# Patient Record
Sex: MALE | Race: ASIAN | URBAN - METROPOLITAN AREA
[De-identification: names, ages, dates, MRNs, and addresses within clinical notes are randomized per-mention and may not be internally consistent; named-entity substitution may affect disease eponyms.]

---

## 2017-08-17 NOTE — PROGRESS NOTES
HPI:   Beatris Delacruz is a 23year old obese, male who presents for multiple complaints today. Has anxiety and notes worsening symptoms over the past couple months with depressive symptoms.  C/o fatigue, sleep disturbances, intermittent tension headaches, de rashes,no suspicious lesions  EYES:PERRLA, EOMI, conjunctiva are clear  HEENT: atraumatic, normocephalic, mild serous fluid behind both TMs, erythematous nasal mucosa, no sinus tenderness, and throat is clear  NECK: supple,no adenopathy   LUNGS: clear to a Ophthalmic Solution; Place 1 drop into both eyes 2 (two) times daily. Dispense: 5 mL; Refill: 6    5.  Obesity (BMI 30-39.9)  - discussed BMI target  - discussed calorie counting, diet, exercise, and one pound of weight loss per week by decreasing calorie

## 2017-10-06 ENCOUNTER — OFFICE VISIT (OUTPATIENT)
Dept: FAMILY MEDICINE CLINIC | Facility: CLINIC | Age: 22
End: 2017-10-06

## 2017-10-06 VITALS
OXYGEN SATURATION: 98 % | WEIGHT: 228 LBS | HEIGHT: 65 IN | TEMPERATURE: 98 F | SYSTOLIC BLOOD PRESSURE: 110 MMHG | DIASTOLIC BLOOD PRESSURE: 80 MMHG | BODY MASS INDEX: 37.99 KG/M2 | RESPIRATION RATE: 18 BRPM | HEART RATE: 84 BPM

## 2017-10-06 DIAGNOSIS — Z00.00 ROUTINE PHYSICAL EXAMINATION: Primary | ICD-10-CM

## 2017-10-06 DIAGNOSIS — F41.9 ANXIETY: ICD-10-CM

## 2017-10-06 DIAGNOSIS — K59.00 CONSTIPATION, UNSPECIFIED CONSTIPATION TYPE: ICD-10-CM

## 2017-10-06 DIAGNOSIS — E55.9 VITAMIN D DEFICIENCY: ICD-10-CM

## 2017-10-06 DIAGNOSIS — J30.2 CHRONIC SEASONAL ALLERGIC RHINITIS, UNSPECIFIED TRIGGER: ICD-10-CM

## 2017-10-06 DIAGNOSIS — R73.09 ELEVATED HEMOGLOBIN A1C: ICD-10-CM

## 2017-10-06 DIAGNOSIS — H61.21 IMPACTED CERUMEN OF RIGHT EAR: ICD-10-CM

## 2017-10-06 DIAGNOSIS — J45.20 MILD INTERMITTENT ASTHMA WITHOUT COMPLICATION: ICD-10-CM

## 2017-10-06 DIAGNOSIS — E66.9 CLASS 2 OBESITY WITHOUT SERIOUS COMORBIDITY WITH BODY MASS INDEX (BMI) OF 37.0 TO 37.9 IN ADULT, UNSPECIFIED OBESITY TYPE: ICD-10-CM

## 2017-10-06 DIAGNOSIS — K21.9 GASTROESOPHAGEAL REFLUX DISEASE WITHOUT ESOPHAGITIS: ICD-10-CM

## 2017-10-06 PROCEDURE — 99395 PREV VISIT EST AGE 18-39: CPT | Performed by: FAMILY MEDICINE

## 2017-10-06 RX ORDER — MONTELUKAST SODIUM 10 MG/1
10 TABLET ORAL DAILY
Qty: 30 TABLET | Refills: 5 | Status: SHIPPED | OUTPATIENT
Start: 2017-10-06 | End: 2020-05-08

## 2017-10-06 RX ORDER — FLUTICASONE PROPIONATE 50 MCG
2 SPRAY, SUSPENSION (ML) NASAL DAILY
Qty: 1 BOTTLE | Refills: 5 | Status: SHIPPED | OUTPATIENT
Start: 2017-10-06 | End: 2019-04-19

## 2017-10-06 RX ORDER — OMEPRAZOLE 20 MG/1
20 CAPSULE, DELAYED RELEASE ORAL
Qty: 30 CAPSULE | Refills: 5 | Status: SHIPPED | OUTPATIENT
Start: 2017-10-06 | End: 2019-08-22

## 2017-10-06 NOTE — PROGRESS NOTES
Tramaine Pate is a 23year old male. HPI:  Patient is here today for routine physical.  H/o acid reflux a few years now. Intermittent flareups. Takes omeprazole daily as needed, which helps.   Drinks coffee once/day  Tries to avoid dietary triggers like sp Ophthalmic Solution Place 1 drop into both eyes 2 (two) times daily. Disp: 5 mL Rfl: 6   Albuterol Sulfate 108 (90 Base) MCG/ACT Inhalation Aerosol Powder, Breath Activated Inhale 2 puffs into the lungs every 6 (six) hours as needed.  Disp: 1 each Rfl: 1 defers  Noted recent labs w/  Overall good lipid panel. FBS normal. CMP nl, CBC nl     2. Gastroesophageal reflux disease without esophagitis  Advised on avoidance of dietary triggers. Avoid lying supine right after eating. Weight loss will help.   Recomm deficiency. Advised compliance with daily vitamin D3 supplement 2000 units daily. 10.  Obesity  Reviewed importance of diet/exercise/weight loss.   Consider formal weight loss program.TSH nl

## 2018-07-03 ENCOUNTER — MED REC SCAN ONLY (OUTPATIENT)
Dept: FAMILY MEDICINE CLINIC | Facility: CLINIC | Age: 23
End: 2018-07-03

## 2018-10-06 DIAGNOSIS — J45.20 MILD INTERMITTENT ASTHMA WITHOUT COMPLICATION: ICD-10-CM

## 2018-10-10 NOTE — TELEPHONE ENCOUNTER
LOV 10/17    LAST AAP 10/17     LAST RX   Albuterol Sulfate 108 (90 Base) MCG/ACT Inhalation Aerosol Powder, Breath Activated 1 each 1 8/17/2017         Next OV Visit date not found    PROTOCOL    Asthma & COPD Medication Protocol [id number] 4:48 PM   As

## 2019-04-19 PROBLEM — E55.9 VITAMIN D DEFICIENCY: Status: ACTIVE | Noted: 2019-04-19

## 2019-04-19 PROBLEM — J45.20 MILD INTERMITTENT ASTHMA WITHOUT COMPLICATION: Status: ACTIVE | Noted: 2019-04-19

## 2019-04-19 PROBLEM — R51.9 MORNING HEADACHE: Status: ACTIVE | Noted: 2019-04-19

## 2019-04-19 PROBLEM — E66.9 OBESITY (BMI 30-39.9): Status: ACTIVE | Noted: 2019-04-19

## 2019-04-19 PROBLEM — J45.20 MILD INTERMITTENT ASTHMA WITHOUT COMPLICATION (HCC): Status: ACTIVE | Noted: 2019-04-19

## 2019-04-19 PROBLEM — R53.83 FATIGUE, UNSPECIFIED TYPE: Status: ACTIVE | Noted: 2019-04-19

## 2019-04-19 PROBLEM — R41.840 DIFFICULTY CONCENTRATING: Status: ACTIVE | Noted: 2019-04-19

## 2019-04-19 PROBLEM — R10.13 DYSPEPSIA: Status: ACTIVE | Noted: 2019-04-19

## 2019-04-19 PROBLEM — G47.30 SLEEP-DISORDERED BREATHING: Status: ACTIVE | Noted: 2019-04-19

## 2019-06-08 PROCEDURE — 87338 HPYLORI STOOL AG IA: CPT | Performed by: INTERNAL MEDICINE

## 2019-07-19 PROBLEM — L20.9 ATOPIC DERMATITIS, UNSPECIFIED TYPE: Status: ACTIVE | Noted: 2019-07-19

## 2019-07-19 PROBLEM — R07.9 CHEST PAIN, UNSPECIFIED TYPE: Status: ACTIVE | Noted: 2019-07-19

## 2019-07-19 PROBLEM — F41.1 GAD (GENERALIZED ANXIETY DISORDER): Status: ACTIVE | Noted: 2019-07-19

## 2022-12-01 ENCOUNTER — HOSPITAL ENCOUNTER (OUTPATIENT)
Age: 27
Discharge: HOME | End: 2022-12-01
Attending: EMERGENCY MEDICINE
Payer: COMMERCIAL

## 2022-12-01 ENCOUNTER — APPOINTMENT (OUTPATIENT)
Dept: GENERAL RADIOLOGY | Age: 27
End: 2022-12-01
Attending: EMERGENCY MEDICINE
Payer: COMMERCIAL

## 2022-12-01 VITALS
WEIGHT: 262 LBS | HEART RATE: 81 BPM | SYSTOLIC BLOOD PRESSURE: 133 MMHG | RESPIRATION RATE: 18 BRPM | DIASTOLIC BLOOD PRESSURE: 79 MMHG | TEMPERATURE: 98 F | BODY MASS INDEX: 42.11 KG/M2 | HEIGHT: 66 IN | OXYGEN SATURATION: 99 %

## 2022-12-01 DIAGNOSIS — R07.9 CHEST PAIN OF UNCERTAIN ETIOLOGY: Primary | ICD-10-CM

## 2022-12-01 LAB
#MXD IC: 1.4 X10ˆ3/UL (ref 0.1–1)
ATRIAL RATE: 96 BPM
BUN BLD-MCNC: 21 MG/DL (ref 7–18)
CHLORIDE BLD-SCNC: 104 MMOL/L (ref 98–112)
CO2 BLD-SCNC: 25 MMOL/L (ref 21–32)
CREAT BLD-MCNC: 1.2 MG/DL
DDIMER WHOLE BLOOD: <200 NG/ML DDU (ref ?–400)
GFR SERPLBLD BASED ON 1.73 SQ M-ARVRAT: 84 ML/MIN/1.73M2 (ref 60–?)
GLUCOSE BLD-MCNC: 99 MG/DL (ref 70–99)
HCT VFR BLD AUTO: 43.1 %
HCT VFR BLD CALC: 43 %
HGB BLD-MCNC: 14.5 G/DL
ISTAT IONIZED CALCIUM FOR CHEM 8: 1.13 MMOL/L (ref 1.12–1.32)
LYMPHOCYTES # BLD AUTO: 3.8 X10ˆ3/UL (ref 1–4)
LYMPHOCYTES NFR BLD AUTO: 23.9 %
MCH RBC QN AUTO: 27.7 PG (ref 26–34)
MCHC RBC AUTO-ENTMCNC: 33.6 G/DL (ref 31–37)
MCV RBC AUTO: 82.3 FL (ref 80–100)
MIXED CELL %: 8.8 %
NEUTROPHILS # BLD AUTO: 10.5 X10ˆ3/UL (ref 1.5–7.7)
NEUTROPHILS NFR BLD AUTO: 67.3 %
P AXIS: 68 DEGREES
P-R INTERVAL: 160 MS
PLATELET # BLD AUTO: 343 X10ˆ3/UL (ref 150–450)
POCT BILIRUBIN URINE: NEGATIVE
POCT GLUCOSE URINE: NEGATIVE MG/DL
POCT KETONE URINE: NEGATIVE MG/DL
POCT LEUKOCYTE ESTERASE URINE: NEGATIVE
POCT NITRITE URINE: NEGATIVE
POCT PH URINE: 6 (ref 5–8)
POCT PROTEIN URINE: NEGATIVE MG/DL
POCT SPECIFIC GRAVITY URINE: 1.02
POCT URINE CLARITY: CLEAR
POCT URINE COLOR: YELLOW
POCT UROBILINOGEN URINE: 0.2 MG/DL
POTASSIUM BLD-SCNC: 3.9 MMOL/L (ref 3.6–5.1)
Q-T INTERVAL: 358 MS
QRS DURATION: 94 MS
QTC CALCULATION (BEZET): 452 MS
R AXIS: 59 DEGREES
RBC # BLD AUTO: 5.24 X10ˆ6/UL
SODIUM BLD-SCNC: 138 MMOL/L (ref 136–145)
T AXIS: 36 DEGREES
TROPONIN I BLD-MCNC: 0.02 NG/ML
TROPONIN I BLD-MCNC: <0.02 NG/ML
VENTRICULAR RATE: 96 BPM
WBC # BLD AUTO: 15.7 X10ˆ3/UL (ref 4–11)

## 2022-12-01 PROCEDURE — 99205 OFFICE O/P NEW HI 60 MIN: CPT

## 2022-12-01 PROCEDURE — 99204 OFFICE O/P NEW MOD 45 MIN: CPT

## 2022-12-01 PROCEDURE — 71046 X-RAY EXAM CHEST 2 VIEWS: CPT | Performed by: EMERGENCY MEDICINE

## 2022-12-01 PROCEDURE — 85025 COMPLETE CBC W/AUTO DIFF WBC: CPT | Performed by: EMERGENCY MEDICINE

## 2022-12-01 PROCEDURE — 81002 URINALYSIS NONAUTO W/O SCOPE: CPT | Performed by: EMERGENCY MEDICINE

## 2022-12-01 PROCEDURE — 93005 ELECTROCARDIOGRAM TRACING: CPT

## 2022-12-01 PROCEDURE — 85378 FIBRIN DEGRADE SEMIQUANT: CPT | Performed by: EMERGENCY MEDICINE

## 2022-12-01 PROCEDURE — 80047 BASIC METABLC PNL IONIZED CA: CPT

## 2022-12-01 PROCEDURE — 93010 ELECTROCARDIOGRAM REPORT: CPT

## 2022-12-01 PROCEDURE — 84484 ASSAY OF TROPONIN QUANT: CPT

## 2022-12-01 PROCEDURE — 96374 THER/PROPH/DIAG INJ IV PUSH: CPT

## 2022-12-01 RX ORDER — ASPIRIN 81 MG/1
324 TABLET, CHEWABLE ORAL ONCE
Status: COMPLETED | OUTPATIENT
Start: 2022-12-01 | End: 2022-12-01

## 2022-12-01 RX ORDER — VENLAFAXINE 100 MG/1
100 TABLET ORAL 2 TIMES DAILY
COMMUNITY

## 2022-12-01 RX ORDER — KETOROLAC TROMETHAMINE 30 MG/ML
30 INJECTION, SOLUTION INTRAMUSCULAR; INTRAVENOUS ONCE
Status: COMPLETED | OUTPATIENT
Start: 2022-12-01 | End: 2022-12-01

## 2022-12-01 NOTE — ED INITIAL ASSESSMENT (HPI)
Chest pain intermittent x 1 week. Pain upper left chest  Pain left arm with movement and numbness. Today had dizziness, SOB and nausea which concerned patient to come in for evaluation.

## 2022-12-02 NOTE — DISCHARGE INSTRUCTIONS
Take aspirin daily. Follow-up with your doctor for outpatient stress test.        You were seen in the immediate care in a limited time. There is a possibility that although we do not see any acute process at this present time that things can change with time. Is therefore imperative that you follow-up with primary care physician for close follow-up. If there is any significant progression of your pain  or other symptoms you to return immediately to the emergency room. DISCHARGE ISSUE - OTHER

## 2024-05-20 ENCOUNTER — TELEPHONE (OUTPATIENT)
Facility: CLINIC | Age: 29
End: 2024-05-20

## 2024-05-20 NOTE — TELEPHONE ENCOUNTER
Pt called for initial sleep consultation, stated his [facility] will be active in June; our first consultations with the sleep specialist are currently September so this will meet the insurance start date he mentions.     LVM to offer consultation. Does not appear to have prior Sleep Studies on file.  
yes

## 2024-05-21 ENCOUNTER — OFFICE VISIT (OUTPATIENT)
Dept: SLEEP MEDICINE | Age: 29
End: 2024-05-21

## 2024-05-21 VITALS
SYSTOLIC BLOOD PRESSURE: 142 MMHG | OXYGEN SATURATION: 98 % | BODY MASS INDEX: 43.39 KG/M2 | WEIGHT: 270 LBS | DIASTOLIC BLOOD PRESSURE: 82 MMHG | HEIGHT: 66 IN | HEART RATE: 97 BPM

## 2024-05-21 DIAGNOSIS — G47.31 CENTRAL SLEEP APNEA: ICD-10-CM

## 2024-05-21 DIAGNOSIS — G47.33 OBSTRUCTIVE SLEEP APNEA (ADULT) (PEDIATRIC): Primary | ICD-10-CM

## 2024-05-21 PROCEDURE — 99202 OFFICE O/P NEW SF 15 MIN: CPT

## 2024-05-21 PROCEDURE — 99203 OFFICE O/P NEW LOW 30 MIN: CPT

## 2024-05-21 RX ORDER — ALBUTEROL SULFATE 90 UG/1
2 AEROSOL, METERED RESPIRATORY (INHALATION) EVERY 6 HOURS PRN
COMMUNITY
Start: 2023-12-29

## 2024-05-21 RX ORDER — BUDESONIDE AND FORMOTEROL FUMARATE DIHYDRATE 160; 4.5 UG/1; UG/1
2 AEROSOL RESPIRATORY (INHALATION) 2 TIMES DAILY
COMMUNITY
Start: 2023-12-29

## 2024-05-21 RX ORDER — PANTOPRAZOLE SODIUM 40 MG/1
40 TABLET, DELAYED RELEASE ORAL DAILY
COMMUNITY
Start: 2024-05-09

## 2024-05-21 RX ORDER — FLUOXETINE HYDROCHLORIDE 40 MG/1
40 CAPSULE ORAL DAILY
COMMUNITY
Start: 2023-12-28

## 2024-05-21 RX ORDER — MONTELUKAST SODIUM 10 MG/1
1 TABLET ORAL DAILY
COMMUNITY
Start: 2023-12-29

## 2024-05-21 RX ORDER — FLUTICASONE PROPIONATE 50 MCG
2 SPRAY, SUSPENSION (ML) NASAL DAILY
COMMUNITY
Start: 2023-12-29

## 2024-05-21 RX ORDER — METFORMIN HYDROCHLORIDE 750 MG/1
750 TABLET, EXTENDED RELEASE ORAL
COMMUNITY

## 2024-05-21 ASSESSMENT — ENCOUNTER SYMPTOMS
GASTROINTESTINAL NEGATIVE: 1
EYES NEGATIVE: 1
SNORING: 1
EXCESSIVE DAYTIME SLEEPINESS: 1
ENDOCRINE NEGATIVE: 1
SLEEP DISTURBANCES DUE TO BREATHING: 1
HEADACHES: 1
INSOMNIA: 0

## 2024-05-21 ASSESSMENT — SLEEP AND FATIGUE QUESTIONNAIRES
HOW LIKELY ARE YOU TO NOD OFF OR FALL ASLEEP WHILE SITTING INACTIVE IN A PUBLIC PLACE: WOULD NEVER DOZE
HOW LIKELY ARE YOU TO NOD OFF OR FALL ASLEEP WHEN YOU ARE A PASSENGER IN A CAR FOR AN HOUR WITHOUT A BREAK: SLIGHT CHANCE OF DOZING
ESS TOTAL SCORE: 7
HOW LIKELY ARE YOU TO NOD OFF OR FALL ASLEEP WHILE SITTING AND TALKING TO SOMEONE: WOULD NEVER DOZE
HOW LIKELY ARE YOU TO NOD OFF OR FALL ASLEEP WHILE SITTING AND READING: MODERATE CHANCE OF DOZING
HOW LIKELY ARE YOU TO NOD OFF OR FALL ASLEEP IN A CAR, WHILE STOPPED FOR A FEW MINUTES IN TRAFFIC: WOULD NEVER DOZE
HOW LIKELY ARE YOU TO NOD OFF OR FALL ASLEEP WHILE SITTING QUIETLY AFTER LUNCH WITHOUT ALCOHOL: WOULD NEVER DOZE
HOW LIKELY ARE YOU TO NOD OFF OR FALL ASLEEP WHILE LYING DOWN TO REST IN THE AFTERNOON WHEN CIRCUMSTANCES PERMIT: HIGH CHANCE OF DOZING
HOW LIKELY ARE YOU TO NOD OFF OR FALL ASLEEP WHILE WATCHING TV: SLIGHT CHANCE OF DOZING

## 2024-07-09 ENCOUNTER — E-ADVICE (OUTPATIENT)
Dept: SLEEP MEDICINE | Age: 29
End: 2024-07-09

## (undated) NOTE — LETTER
ASTHMA ACTION PLAN for Serina Tarah     : 1995     Date: 10/6/2017  Provider:  Shaun Jacques MD  Phone for doctor or clinic: [facility], [address] [place] [address] [phone]

## (undated) NOTE — LETTER
11/08/17        [address]      Dear Diana Antonio,    [id number] records indicate that you have outstanding lab work and or testing that was ordered for you and has not yet been completed:    Comp Metabolic Panel  Hemoglobin A1C